# Patient Record
Sex: FEMALE | Race: OTHER | Employment: UNEMPLOYED | URBAN - METROPOLITAN AREA
[De-identification: names, ages, dates, MRNs, and addresses within clinical notes are randomized per-mention and may not be internally consistent; named-entity substitution may affect disease eponyms.]

---

## 2017-03-06 ENCOUNTER — HOSPITAL ENCOUNTER (EMERGENCY)
Age: 37
Discharge: HOME | End: 2017-03-06
Attending: EMERGENCY MEDICINE
Payer: SELF-PAY

## 2017-03-06 VITALS
RESPIRATION RATE: 16 BRPM | DIASTOLIC BLOOD PRESSURE: 78 MMHG | OXYGEN SATURATION: 96 % | HEART RATE: 84 BPM | SYSTOLIC BLOOD PRESSURE: 140 MMHG | TEMPERATURE: 98.1 F

## 2017-03-06 DIAGNOSIS — H00.14 CHALAZION LEFT UPPER EYELID: Primary | ICD-10-CM

## 2017-03-06 PROCEDURE — 99283 EMERGENCY DEPT VISIT LOW MDM: CPT | Performed by: NURSE PRACTITIONER

## 2017-03-06 NOTE — ED NOTES
I have reviewed discharge instructions with the patient and spouse. The patient and spouse verbalized understanding.      used for pt treatment and d/c instructions

## 2017-03-06 NOTE — ED PROVIDER NOTES
HPI Comments: Patient presents with swelling and pain to the left upper eye lid. She states symptoms started approx 10 days ago. She denies visual changes and drainage from eye. Patient is a 29 y.o. female presenting with eye pain. The history is provided by the patient. Eye Pain    This is a new problem. The current episode started more than 1 week ago. The problem occurs constantly. The problem has not changed since onset. The left eye is affected. The injury mechanism was none. The pain is at a severity of 5/10. The pain is mild. There is no history of trauma to the eye. There is no known exposure to pink eye. She does not wear contacts. Associated symptoms include pain. Pertinent negatives include no numbness, no blurred vision, no decreased vision, no discharge, no double vision, no foreign body sensation, no photophobia, no eye redness, no nausea, no tingling, no weakness, no itching, no fever, no blindness, no head injury and no dizziness. History reviewed. No pertinent past medical history. History reviewed. No pertinent surgical history. History reviewed. No pertinent family history. Social History     Social History    Marital status: SINGLE     Spouse name: N/A    Number of children: N/A    Years of education: N/A     Occupational History    Not on file. Social History Main Topics    Smoking status: Not on file    Smokeless tobacco: Not on file    Alcohol use Not on file    Drug use: Not on file    Sexual activity: Not on file     Other Topics Concern    Not on file     Social History Narrative    No narrative on file         ALLERGIES: Review of patient's allergies indicates no known allergies. Review of Systems   Constitutional: Negative for fever. HENT: Negative for congestion. Eyes: Positive for pain. Negative for blindness, blurred vision, double vision, photophobia, discharge, redness, itching and visual disturbance.    Respiratory: Negative for cough and shortness of breath. Cardiovascular: Negative for chest pain. Gastrointestinal: Negative for nausea. Skin: Negative for itching. Neurological: Negative for dizziness, tingling, weakness and numbness. Vitals:    03/06/17 1004   BP: 148/83   Pulse: 88   Resp: 16   Temp: 98.1 °F (36.7 °C)   SpO2: 96%            Physical Exam   Constitutional: She is oriented to person, place, and time. She appears well-developed and well-nourished. No distress. HENT:   Head: Normocephalic and atraumatic. Eyes: Conjunctivae and EOM are normal. Pupils are equal, round, and reactive to light. Left eye exhibits hordeolum. Left eye exhibits no discharge. Cardiovascular: Normal rate, regular rhythm, normal heart sounds and intact distal pulses. Pulmonary/Chest: Effort normal and breath sounds normal. No respiratory distress. Abdominal: Soft. Bowel sounds are normal. She exhibits no distension. There is no tenderness. Musculoskeletal: Normal range of motion. Neurological: She is alert and oriented to person, place, and time. Skin: Skin is warm and dry. No rash noted. She is not diaphoretic. No erythema. Psychiatric: She has a normal mood and affect. Her behavior is normal.   Nursing note and vitals reviewed. MDM  Number of Diagnoses or Management Options  Chalazion left upper eyelid: new and does not require workup  Diagnosis management comments: Patient encouraged to use warm compresses to left eye lid. Discharge home to follow up as needed.      Patient Progress  Patient progress: stable    ED Course       Procedures

## 2017-03-06 NOTE — DISCHARGE INSTRUCTIONS
Jesisca Arm y chalaziones: Instrucciones de cuidado - [ Styes and Chalazia: Care Instructions ]  Instrucciones de cuidado    Los orzuelos y chalaziones (calacios) son afecciones que pueden causar hinchazón del párpado. Un orzuelo es gissel infección en la raíz de gissel pestaña. La infección causa un bulto sensible y pereira en el borde del párpado. La infección se puede extender hasta que todo el párpado se ponga pereira y se inflame. El orzuelo por lo general revienta y escurre gissel pequeña cantidad de pus. Por lo general desaparecen por sí mismos en gissel semana más o menos, purvi en ocasiones requieren tratamiento con antibióticos. Un chalazión es un bulto o quiste en el párpado. Es provocado por la hinchazón e inflamación de las glándulas sebáceas profundas que están dentro del párpado. Los chalaziones no suelen infectarse. Pueden roxi unos meses para sanar. Si el chalazión se inflama y duele más o si no desaparece, es posible que necesite ser drenado por un médico.  La atención de seguimiento es gissel parte clave de funez tratamiento y seguridad. Asegúrese de hacer y acudir a todas las citas, y llame a funez médico si está teniendo problemas. También es gissel buena idea saber los resultados de los exámenes y mantener gissel lista de los medicamentos que lynne. ¿Cómo puede cuidarse en el hogar? · No se restriegue los ojos. No se exprima ni trate de abrir un orzuelo o un chalazión. · Para ayudar a que sane más pronto un orzuelo o chalazión:  ¨ Póngase gissel compresa húmeda y tibia en el wyatt santhosh 5 a 10 minutos, 3 a 6 veces al día. El calor a menudo lleva al orzuelo a un punto en que se drena por sí solo. Tenga en cuenta que la aplicación de compresas tibias muchas veces aumenta un poco la hinchazón al principio. ¨ No use [place], ni caliente gissel toallita húmeda en el horno microondas. La compresa podría calentarse demasiado y quemarle el párpado.   · Lávese siempre las ian antes y después de usar gissel compresa o [employer] ojos.  · Si el médico le angelo gotas o un ungüento antibióticos, úselos exactamente daja se lo haya indicado. Use el medicamento todo el tiempo indicado, aunque el wyatt empiece a sentirse mejor. · Para ponerse ungüento o gotas para los ojos:  ¨ Incline la [place] atrás y, con un dedo, baje el párpado inferior. ¨ Deje caer las gotas o un chorrito del medicamento dentro del párpado inferior. ¨ Cierre el wyatt santhosh 30 a 61 segundos para permitir que las gotas o el ungüento se esparzan. ¨ No permita que la punta del ungüento o del gotero toque eduardo pestañas ni otra superficie. · No utilice maquillaje en los ojos ni lentes de contacto hasta que el orzuelo o el chalazión hayan sanado. · Mientras tenga el orzuelo no comparta toallas, almohadas ni toallitas para la marline. ¿Cuándo debe pedir ayuda? Llame a funez médico ahora mismo o busque atención médica inmediata si:  · Siente dolor en el wyatt. · Tiene cambios o pérdida de la visión. · El enrojecimiento y la hinchazón [address] Preste especial atención a los cambios en funez parker y asegúrese de comunicarse con funez médico si:  · El orzuelo no mejora en 1 semana. · El chalazión no empieza a mejorar después de varias semanas. ¿Dónde puede encontrar más información en inglés? Moni Giselle a [email or web address]-wesley.[email or web address] Zuleima Castellanos C805 en la búsqueda para aprender más acerca de \"Orzuelos y chalaziones: Instrucciones de cuidado - [ Styes and Chalazia: Care Instructions ]. \"  Revisado: [address] [place], 2016  Versión del contenido: 11.1  © 9369-6628 [employer]. Las instrucciones de cuidado fueron adaptadas bajo licencia por [employer] (which disclaims liability or warranty for this information). Si usted tiene [place] afección médica o sobre estas instrucciones, siempre pregunte a funez profesional de parker. [employer] niega toda garantía o responsabilidad por funez uso de esta información.

## 2021-06-07 ENCOUNTER — HOSPITAL ENCOUNTER (EMERGENCY)
Age: 41
Discharge: STILL ADMITTED OR ADMINISTRATIVE | End: 2021-06-07